# Patient Record
Sex: FEMALE | URBAN - METROPOLITAN AREA
[De-identification: names, ages, dates, MRNs, and addresses within clinical notes are randomized per-mention and may not be internally consistent; named-entity substitution may affect disease eponyms.]

---

## 2023-01-11 ENCOUNTER — HOSPITAL ENCOUNTER (OUTPATIENT)
Dept: GENERAL RADIOLOGY | Age: 48
Discharge: HOME | End: 2023-01-14

## 2023-01-11 DIAGNOSIS — T14.90XA INJURY: ICD-10-CM

## 2023-01-11 PROCEDURE — 73562 X-RAY EXAM OF KNEE 3: CPT

## 2023-01-11 PROCEDURE — 73590 X-RAY EXAM OF LOWER LEG: CPT

## 2023-01-11 PROCEDURE — 73130 X-RAY EXAM OF HAND: CPT

## 2023-01-11 PROCEDURE — 73030 X-RAY EXAM OF SHOULDER: CPT

## 2023-01-11 PROCEDURE — 73610 X-RAY EXAM OF ANKLE: CPT

## 2023-01-11 PROCEDURE — 73110 X-RAY EXAM OF WRIST: CPT

## 2023-01-20 ENCOUNTER — HOSPITAL ENCOUNTER (EMERGENCY)
Age: 48
Discharge: HOME | End: 2023-01-20
Attending: STUDENT IN AN ORGANIZED HEALTH CARE EDUCATION/TRAINING PROGRAM | Admitting: STUDENT IN AN ORGANIZED HEALTH CARE EDUCATION/TRAINING PROGRAM
Payer: COMMERCIAL

## 2023-01-20 ENCOUNTER — APPOINTMENT (OUTPATIENT)
Dept: GENERAL RADIOLOGY | Age: 48
End: 2023-01-20
Payer: COMMERCIAL

## 2023-01-20 ENCOUNTER — APPOINTMENT (OUTPATIENT)
Dept: ULTRASOUND IMAGING | Age: 48
End: 2023-01-20
Payer: COMMERCIAL

## 2023-01-20 VITALS
DIASTOLIC BLOOD PRESSURE: 79 MMHG | SYSTOLIC BLOOD PRESSURE: 144 MMHG | OXYGEN SATURATION: 97 % | BODY MASS INDEX: 39.99 KG/M2 | HEART RATE: 70 BPM | TEMPERATURE: 98.2 F | WEIGHT: 240 LBS | RESPIRATION RATE: 16 BRPM | HEIGHT: 65 IN

## 2023-01-20 DIAGNOSIS — L03.119 CELLULITIS AND ABSCESS OF LEG: ICD-10-CM

## 2023-01-20 DIAGNOSIS — M79.605 LEFT LEG PAIN: Primary | ICD-10-CM

## 2023-01-20 DIAGNOSIS — S80.12XD TRAUMATIC ECCHYMOSIS OF LEFT LOWER LEG, SUBSEQUENT ENCOUNTER: ICD-10-CM

## 2023-01-20 DIAGNOSIS — L02.419 CELLULITIS AND ABSCESS OF LEG: ICD-10-CM

## 2023-01-20 LAB
ALBUMIN SERPL-MCNC: 3.8 G/DL (ref 3.5–5)
ALBUMIN/GLOB SERPL: 1.1 (ref 0.4–1.6)
ALP SERPL-CCNC: 86 U/L (ref 50–130)
ALT SERPL-CCNC: 37 U/L (ref 12–65)
ANION GAP SERPL CALC-SCNC: 8 MMOL/L (ref 2–11)
AST SERPL-CCNC: 16 U/L (ref 15–37)
BILIRUB SERPL-MCNC: 0.5 MG/DL (ref 0.2–1.1)
BUN SERPL-MCNC: 16 MG/DL (ref 6–23)
CALCIUM SERPL-MCNC: 9.4 MG/DL (ref 8.3–10.4)
CHLORIDE SERPL-SCNC: 104 MMOL/L (ref 101–110)
CO2 SERPL-SCNC: 29 MMOL/L (ref 21–32)
CREAT SERPL-MCNC: 0.85 MG/DL (ref 0.6–1)
D DIMER PPP FEU-MCNC: 1.38 UG/ML(FEU)
ERYTHROCYTE [DISTWIDTH] IN BLOOD BY AUTOMATED COUNT: 12.7 % (ref 11.9–14.6)
GLOBULIN SER CALC-MCNC: 3.6 G/DL (ref 2.8–4.5)
GLUCOSE SERPL-MCNC: 115 MG/DL (ref 65–100)
HCT VFR BLD AUTO: 39.4 % (ref 35.8–46.3)
HGB BLD-MCNC: 13.1 G/DL (ref 11.7–15.4)
MCH RBC QN AUTO: 30.2 PG (ref 26.1–32.9)
MCHC RBC AUTO-ENTMCNC: 33.2 G/DL (ref 31.4–35)
MCV RBC AUTO: 90.8 FL (ref 82–102)
NRBC # BLD: 0 K/UL (ref 0–0.2)
PLATELET # BLD AUTO: 280 K/UL (ref 150–450)
PMV BLD AUTO: 9 FL (ref 9.4–12.3)
POTASSIUM SERPL-SCNC: 3.7 MMOL/L (ref 3.5–5.1)
PROT SERPL-MCNC: 7.4 G/DL (ref 6.3–8.2)
RBC # BLD AUTO: 4.34 M/UL (ref 4.05–5.2)
SODIUM SERPL-SCNC: 141 MMOL/L (ref 133–143)
WBC # BLD AUTO: 5.8 K/UL (ref 4.3–11.1)

## 2023-01-20 PROCEDURE — 93971 EXTREMITY STUDY: CPT

## 2023-01-20 PROCEDURE — 96374 THER/PROPH/DIAG INJ IV PUSH: CPT

## 2023-01-20 PROCEDURE — 6360000002 HC RX W HCPCS: Performed by: NURSE PRACTITIONER

## 2023-01-20 PROCEDURE — 73590 X-RAY EXAM OF LOWER LEG: CPT

## 2023-01-20 PROCEDURE — 85027 COMPLETE CBC AUTOMATED: CPT

## 2023-01-20 PROCEDURE — 6370000000 HC RX 637 (ALT 250 FOR IP): Performed by: NURSE PRACTITIONER

## 2023-01-20 PROCEDURE — 99284 EMERGENCY DEPT VISIT MOD MDM: CPT

## 2023-01-20 PROCEDURE — 96375 TX/PRO/DX INJ NEW DRUG ADDON: CPT

## 2023-01-20 PROCEDURE — 80053 COMPREHEN METABOLIC PANEL: CPT

## 2023-01-20 PROCEDURE — 85379 FIBRIN DEGRADATION QUANT: CPT

## 2023-01-20 RX ORDER — CLINDAMYCIN HYDROCHLORIDE 150 MG/1
450 CAPSULE ORAL 3 TIMES DAILY
Qty: 63 CAPSULE | Refills: 0 | Status: SHIPPED | OUTPATIENT
Start: 2023-01-20 | End: 2023-01-27

## 2023-01-20 RX ORDER — EPINEPHRINE 0.3 MG/.3ML
0.3 INJECTION SUBCUTANEOUS
COMMUNITY
Start: 2021-09-29

## 2023-01-20 RX ORDER — FLUTICASONE PROPIONATE 50 MCG
1 SPRAY, SUSPENSION (ML) NASAL
COMMUNITY
Start: 2021-07-14

## 2023-01-20 RX ORDER — CLINDAMYCIN HYDROCHLORIDE 150 MG/1
450 CAPSULE ORAL
Status: COMPLETED | OUTPATIENT
Start: 2023-01-20 | End: 2023-01-20

## 2023-01-20 RX ORDER — IBUPROFEN 800 MG/1
800 TABLET ORAL EVERY 8 HOURS PRN
COMMUNITY
Start: 2021-11-09

## 2023-01-20 RX ORDER — IBUPROFEN 800 MG/1
800 TABLET ORAL EVERY 8 HOURS PRN
Qty: 21 TABLET | Refills: 0 | Status: SHIPPED | OUTPATIENT
Start: 2023-01-20

## 2023-01-20 RX ORDER — CETIRIZINE HYDROCHLORIDE 10 MG/1
TABLET ORAL
COMMUNITY

## 2023-01-20 RX ORDER — MORPHINE SULFATE 10 MG/ML
4 INJECTION INTRAVENOUS ONCE
Status: COMPLETED | OUTPATIENT
Start: 2023-01-20 | End: 2023-01-20

## 2023-01-20 RX ORDER — TRAMADOL HYDROCHLORIDE 50 MG/1
50 TABLET ORAL
Status: COMPLETED | OUTPATIENT
Start: 2023-01-20 | End: 2023-01-20

## 2023-01-20 RX ORDER — AZELASTINE 1 MG/ML
1 SPRAY, METERED NASAL
COMMUNITY
Start: 2021-07-14

## 2023-01-20 RX ORDER — IBUPROFEN 800 MG/1
800 TABLET ORAL
Status: COMPLETED | OUTPATIENT
Start: 2023-01-20 | End: 2023-01-20

## 2023-01-20 RX ORDER — ALBUTEROL SULFATE 90 UG/1
1 AEROSOL, METERED RESPIRATORY (INHALATION) EVERY 6 HOURS PRN
COMMUNITY
Start: 2021-12-22

## 2023-01-20 RX ORDER — ONDANSETRON 2 MG/ML
4 INJECTION INTRAMUSCULAR; INTRAVENOUS
Status: COMPLETED | OUTPATIENT
Start: 2023-01-20 | End: 2023-01-20

## 2023-01-20 RX ADMIN — TRAMADOL HYDROCHLORIDE 50 MG: 50 TABLET ORAL at 17:12

## 2023-01-20 RX ADMIN — CLINDAMYCIN HYDROCHLORIDE 450 MG: 150 CAPSULE ORAL at 20:14

## 2023-01-20 RX ADMIN — MORPHINE SULFATE 4 MG: 10 INJECTION INTRAVENOUS at 18:19

## 2023-01-20 RX ADMIN — ONDANSETRON 4 MG: 2 INJECTION INTRAMUSCULAR; INTRAVENOUS at 18:19

## 2023-01-20 RX ADMIN — IBUPROFEN 800 MG: 800 TABLET, FILM COATED ORAL at 17:12

## 2023-01-20 ASSESSMENT — PAIN SCALES - GENERAL
PAINLEVEL_OUTOF10: 7
PAINLEVEL_OUTOF10: 6
PAINLEVEL_OUTOF10: 4

## 2023-01-20 ASSESSMENT — ENCOUNTER SYMPTOMS
NAUSEA: 0
SHORTNESS OF BREATH: 0
BACK PAIN: 0
ABDOMINAL PAIN: 0
COLOR CHANGE: 1
EYES NEGATIVE: 1
DIARRHEA: 0

## 2023-01-20 ASSESSMENT — PAIN - FUNCTIONAL ASSESSMENT: PAIN_FUNCTIONAL_ASSESSMENT: 0-10

## 2023-01-20 NOTE — ED NOTES
Medicated as ordered with health teaching before pt went to xray per MD; IV site intact. Pt alert, resp easy, NAD.       Leonardo Jerry RN  01/20/23 8599

## 2023-01-20 NOTE — ED NOTES
Pt states fell down the stairs approximately 10 days ago, injuring left shin. C/o pain to touch, denies numbness, states able to walk on. Bruising, swelling and dark raised lump to left shin with some warmth. Pt states painful to touch all the way into thigh. Pedal pulse strong.       Tay Luz RN  01/20/23 Bon Torres RN  01/20/23 5502

## 2023-01-20 NOTE — ED NOTES
Pt returned from ultrasound, states \"residual pain\" after exams. Resting in bed alert, resp easy, NAD.       Sandhya Brower RN  01/20/23 2060

## 2023-01-20 NOTE — ED TRIAGE NOTES
Pt w/c to triage with c/o left lower leg pain/injury. Pt states she fell down a flight of stairs/raquel 6 steps on 1/11/23 at 0925. Pt reports she was seen that same morning by Josiah, had an XR, and was diagnosed with a bone bruise. Pt reports she woke today with a red spot to the back of her calf that she reports has resolved, but c/o continuous bruising and pain. Pt was told to come be seen for possible blood clot. Pt denies history of blood clots.

## 2023-01-20 NOTE — ED NOTES
Pt in [address] for DVT study. US tech called provider to inform that Pt refused the US because of pain and unable to tolerate.  - Jaylan Reyes 14:35

## 2023-01-20 NOTE — ED PROVIDER NOTES
Emergency Department Provider Note                   PCP:                Tracey Jay MD               Age: 49 y.o. Sex: female       ICD-10-CM    1. Left leg pain  M79.605       2. Traumatic ecchymosis of left lower leg, subsequent encounter  S80.12XD       3. Cellulitis and abscess of leg  L03.119     L02.419           DISPOSITION      Luke Mayer is a 49 y.o. female who presents to the Emergency Department with chief complaint of left leg pain following fall. Patient attempted to go to vascular studies but was unable to tolerate the procedure due to pain. We will get basic blood work including a D-dimer to further evaluate for clotting. Patient's legs are significantly bruised to the left lower leg. She has not used any heat, ice or compression. No evidence of cellulitis or subq gas. She has normal vitals today. Ibuprofen and tramadol given for pain. 6:31 PM  Dimer is elevated at 1.34. Discussed with patient the need to rule out a DVT to her leg given this elevated blood value. Dose of morphine and Zofran given IV prior to her procedure. She additionally wanted her left lower leg reimaged so x-rays are ordered. The remainder of her blood work is unremarkable. No evidence of elevated white count, stable H&H. Repeat x-ray is negative for acute fracture. Ultrasound is negative for DVT. Upon further review of patient's left lower extremity, I have a concern for an onset of cellulitis. Her leg is red and warm around the 5pm to 12pm location. See attached photo. We will start patient on clindamycin, first dose here in the ED. Instructed her to return to the ED after 36 hours if any worsening redness, warmth or swelling. Patient is agreeable to this plan. PCP follow-up in 1 week for wound reassessment. We will place an Ace bandage to help with compression. Ibuprofen for pain control. Strict return precautions given. Safe for discharge at this time.     Telly Everett FNP-C, ENP-C  12:18 AM      Medical Decision Making  Amount and/or Complexity of Data Reviewed  Labs: ordered. Radiology: ordered. ECG/medicine tests: ordered. Risk  Prescription drug management. Complexity of Problem: 1 stable, acute illness. (3)    I have conducted an independent ordering and review of Labs. I have conducted an independent ordering and review of X-rays. I have conducted an independent ordering and review of Ultrasound. Considerations: Shared decision making was utilized in the care of this patient. Considerations: The following treatments were considered but not given: Rx such as antibiotics. I did end up giving antibiotics upon d/c. Considered CT scan of leg                 Orders Placed This Encounter   Procedures    CBC    Comprehensive Metabolic Panel    Insert peripheral IV    Vascular duplex lower extremity venous left        Medications - No data to display    New Prescriptions    No medications on file        Sindy Pérez is a 49 y.o. female who presents to the Emergency Department with chief complaint of left leg pain following fall. Chief Complaint   Patient presents with    Leg Pain      HPI  Aiden Tucker is a 43-year-old female with history of chronic knee pain, presenting to the ED for evaluation of left leg pain following fall. On January 11, patient reportedly fell down 6 stairs at her workplace. She was imaged and had no broken bones at that time. She has been doing symptomatic management since that time but continues to have worsening bruising and swelling and continued pain. She saw her primary care today and given her continued pain and a small red spot she salt to her posterior calf earlier this morning, they sent her here for rule out DVT. She has no prior history of DVT. She denies chest pain, shortness of breath, recent car travel or surgeries. Review of Systems   Constitutional:  Negative for fever. HENT: Negative. Eyes: Negative. Respiratory:  Negative for shortness of breath. Cardiovascular:  Positive for leg swelling (bilat, left worse). Negative for chest pain. Gastrointestinal:  Negative for abdominal pain, diarrhea and nausea. Musculoskeletal:  Negative for arthralgias and back pain. Skin:  Positive for color change (significant brusing to left leg). Neurological:  Negative for headaches. All other systems reviewed and are negative. History reviewed. No pertinent past medical history. History reviewed. No pertinent surgical history. History reviewed. No pertinent family history. Social History     Socioeconomic History    Marital status: Unknown     Spouse name: None    Number of children: None    Years of education: None    Highest education level: None         Codeine, Dexamethasone, Loracarbef, Nitrofurantoin, Loratadine-pseudoephedrine er, Sulfa antibiotics, and Loratadine     Previous Medications    ALBUTEROL SULFATE HFA (PROVENTIL;VENTOLIN;PROAIR) 108 (90 BASE) MCG/ACT INHALER    Inhale 1 puff into the lungs every 6 hours as needed    ASPIRIN-ACETAMINOPHEN-CAFFEINE (EXCEDRIN PO)    Take by mouth as needed    AZELASTINE (ASTELIN) 0.1 % NASAL SPRAY    1 spray by Nasal route    CETIRIZINE (ZYRTEC) 10 MG TABLET    Take by mouth    EPINEPHRINE (EPIPEN) 0.3 MG/0.3ML SOAJ INJECTION    Inject 0.3 mg into the muscle once as needed    FLUTICASONE (FLONASE) 50 MCG/ACT NASAL SPRAY    1 spray by Nasal route    IBUPROFEN (ADVIL;MOTRIN) 800 MG TABLET    Take 800 mg by mouth every 8 hours as needed        Vitals signs and nursing note reviewed. Patient Vitals for the past 4 hrs:   Temp Pulse Resp BP SpO2   01/20/23 1531 98.2 °F (36.8 °C) 80 17 (!) 136/96 100 %          Physical Exam  Vitals and nursing note reviewed. Constitutional:       Appearance: She is not ill-appearing. HENT:      Mouth/Throat:      Mouth: Mucous membranes are moist.      Pharynx: Oropharynx is clear.    Cardiovascular:      Rate and Rhythm: Normal rate. Pulmonary:      Effort: Pulmonary effort is normal.      Breath sounds: Normal breath sounds. Comments: Respirations even and unlabored  Abdominal:      General: Abdomen is flat. Palpations: Abdomen is soft. Musculoskeletal:      Comments: Mild bruising noted to the right lower leg. Significant bruising and swelling to the left leg and ankle, below the knee. Plantar flexion and extension intact. Pedal pulses are present on both legs. Blister noted over the middle of the tibia around 1 cm x 1 cm in diameter. Skin integrity is intact. Skin:     General: Skin is warm and dry. Psychiatric:         Mood and Affect: Mood normal.          Procedures    No results found for any visits on 01/20/23. Vascular duplex lower extremity venous left   Final Result   1. No evidence of deep venous thrombosis in the left lower extremity. XR TIBIA FIBULA LEFT (2 VIEWS)   Final Result   No displaced fracture. Voice dictation software was used during the making of this note. This software is not perfect and grammatical and other typographical errors may be present. This note has not been completely proofread for errors.         ADAM Zavaleta - MARYLOU  01/21/23 0020

## 2023-01-21 NOTE — DISCHARGE INSTRUCTIONS
As we discussed, I do think you are developing a cellulitis to that left lower leg. Take doses as directed tomorrow. On Sunday morning, you should have no worsening redness or warmth. Use the Ace bandage to help with compression. Use heat to help with blood absorption. Return to the ED in 36 hours if your symptoms worsen in terms of redness and warmth. Take your antibiotic as directed. This will take a long time for the blood to fully reabsorb. Follow-up with your primary care in 1 week return to the ED for new or worsening symptoms. No DVT today.

## 2023-01-21 NOTE — ED NOTES
Pt states awaiting  to pick her up. Crackers given at request. Ace wrap applied with health teaching, pt tolerated well. Ambulated out, NAD.       Joseph Tao RN  01/20/23 2022

## 2023-05-07 ENCOUNTER — HOSPITAL ENCOUNTER (EMERGENCY)
Age: 48
Discharge: HOME | End: 2023-05-07
Attending: EMERGENCY MEDICINE
Payer: COMMERCIAL

## 2023-05-07 ENCOUNTER — APPOINTMENT (OUTPATIENT)
Dept: CT IMAGING | Age: 48
End: 2023-05-07
Payer: COMMERCIAL

## 2023-05-07 VITALS
BODY MASS INDEX: 38.82 KG/M2 | HEIGHT: 65 IN | RESPIRATION RATE: 13 BRPM | OXYGEN SATURATION: 100 % | SYSTOLIC BLOOD PRESSURE: 133 MMHG | HEART RATE: 56 BPM | TEMPERATURE: 98.8 F | DIASTOLIC BLOOD PRESSURE: 94 MMHG | WEIGHT: 233 LBS

## 2023-05-07 DIAGNOSIS — R07.9 CHEST PAIN, UNSPECIFIED TYPE: Primary | ICD-10-CM

## 2023-05-07 LAB
ALBUMIN SERPL-MCNC: 3.4 G/DL (ref 3.5–5)
ALBUMIN/GLOB SERPL: 1.1 (ref 0.4–1.6)
ALP SERPL-CCNC: 68 U/L (ref 50–136)
ALT SERPL-CCNC: 22 U/L (ref 12–65)
ANION GAP SERPL CALC-SCNC: 2 MMOL/L (ref 2–11)
AST SERPL-CCNC: 14 U/L (ref 15–37)
BILIRUB SERPL-MCNC: 0.3 MG/DL (ref 0.2–1.1)
BUN SERPL-MCNC: 18 MG/DL (ref 6–23)
CALCIUM SERPL-MCNC: 8.6 MG/DL (ref 8.3–10.4)
CHLORIDE SERPL-SCNC: 109 MMOL/L (ref 101–110)
CO2 SERPL-SCNC: 28 MMOL/L (ref 21–32)
CREAT SERPL-MCNC: 0.9 MG/DL (ref 0.6–1)
EKG ATRIAL RATE: 55 BPM
EKG DIAGNOSIS: NORMAL
EKG P AXIS: 10 DEGREES
EKG P-R INTERVAL: 123 MS
EKG Q-T INTERVAL: 410 MS
EKG QRS DURATION: 80 MS
EKG QTC CALCULATION (BAZETT): 393 MS
EKG R AXIS: 35 DEGREES
EKG T AXIS: 16 DEGREES
EKG VENTRICULAR RATE: 55 BPM
ERYTHROCYTE [DISTWIDTH] IN BLOOD BY AUTOMATED COUNT: 13.7 % (ref 11.9–14.6)
GLOBULIN SER CALC-MCNC: 3.1 G/DL (ref 2.8–4.5)
GLUCOSE SERPL-MCNC: 113 MG/DL (ref 65–100)
HCT VFR BLD AUTO: 37.8 % (ref 35.8–46.3)
HGB BLD-MCNC: 12.9 G/DL (ref 11.7–15.4)
MCH RBC QN AUTO: 30.8 PG (ref 26.1–32.9)
MCHC RBC AUTO-ENTMCNC: 34.1 G/DL (ref 31.4–35)
MCV RBC AUTO: 90.2 FL (ref 82–102)
NRBC # BLD: 0 K/UL (ref 0–0.2)
PLATELET # BLD AUTO: 199 K/UL (ref 150–450)
PMV BLD AUTO: 9.1 FL (ref 9.4–12.3)
POTASSIUM SERPL-SCNC: 3.6 MMOL/L (ref 3.5–5.1)
PROT SERPL-MCNC: 6.5 G/DL (ref 6.3–8.2)
RBC # BLD AUTO: 4.19 M/UL (ref 4.05–5.2)
SODIUM SERPL-SCNC: 139 MMOL/L (ref 133–143)
TROPONIN I SERPL HS-MCNC: 3.6 PG/ML (ref 0–37)
WBC # BLD AUTO: 5.2 K/UL (ref 4.3–11.1)

## 2023-05-07 PROCEDURE — 80053 COMPREHEN METABOLIC PANEL: CPT

## 2023-05-07 PROCEDURE — 6360000004 HC RX CONTRAST MEDICATION: Performed by: EMERGENCY MEDICINE

## 2023-05-07 PROCEDURE — 71260 CT THORAX DX C+: CPT

## 2023-05-07 PROCEDURE — 85027 COMPLETE CBC AUTOMATED: CPT

## 2023-05-07 PROCEDURE — 94761 N-INVAS EAR/PLS OXIMETRY MLT: CPT

## 2023-05-07 PROCEDURE — 93005 ELECTROCARDIOGRAM TRACING: CPT | Performed by: EMERGENCY MEDICINE

## 2023-05-07 PROCEDURE — 84484 ASSAY OF TROPONIN QUANT: CPT

## 2023-05-07 PROCEDURE — 99285 EMERGENCY DEPT VISIT HI MDM: CPT

## 2023-05-07 RX ADMIN — IOPAMIDOL 100 ML: 755 INJECTION, SOLUTION INTRAVENOUS at 14:18

## 2023-05-07 ASSESSMENT — PAIN - FUNCTIONAL ASSESSMENT: PAIN_FUNCTIONAL_ASSESSMENT: 0-10

## 2023-05-07 ASSESSMENT — ENCOUNTER SYMPTOMS
SHORTNESS OF BREATH: 1
COUGH: 0
DIARRHEA: 0
ABDOMINAL PAIN: 0
FACIAL SWELLING: 0
VOMITING: 0

## 2023-05-07 ASSESSMENT — PAIN SCALES - GENERAL: PAINLEVEL_OUTOF10: 0

## 2023-06-20 ENCOUNTER — INITIAL CONSULT (OUTPATIENT)
Age: 48
End: 2023-06-20
Payer: COMMERCIAL

## 2023-06-20 VITALS
DIASTOLIC BLOOD PRESSURE: 88 MMHG | HEIGHT: 65 IN | BODY MASS INDEX: 39.29 KG/M2 | HEART RATE: 72 BPM | SYSTOLIC BLOOD PRESSURE: 138 MMHG | WEIGHT: 235.8 LBS

## 2023-06-20 DIAGNOSIS — R07.89 CHEST DISCOMFORT: Primary | ICD-10-CM

## 2023-06-20 PROCEDURE — 93000 ELECTROCARDIOGRAM COMPLETE: CPT | Performed by: INTERNAL MEDICINE

## 2023-06-20 PROCEDURE — 99204 OFFICE O/P NEW MOD 45 MIN: CPT | Performed by: INTERNAL MEDICINE

## 2023-06-20 NOTE — PROGRESS NOTES
[facility] CARDIOLOGY  [address] [facility], [address]  PHONE: [phone]      23    NAME:  Nora Burris  : 1975  MRN: [id number]         SUBJECTIVE:   Nora Burris is a 51 y.o. female seen for a consultation visit regarding the following:     Chief Complaint   Patient presents with    Consultation     Chest pain             HPI:  Consultation is requested by ADAM Zarate NP for evaluation of Consultation (Chest pain )   . Ms. Rosanne Durbin presents today for follow-up. Patient was seen in the ER recently with chest pain. Patient stated that chest pain been going on for 3 straight days, said it felt like a panic attack she has had previously however her panic attacks typically not lasted this long. She notes multiple life stressors including the recent addition of her in-laws to her home, both of which suffer from dementia in various stages. She has no prior history of heart disease. She has several family members with what sounds like atrial fibrillation. There is no first-degree relatives with early onset CAD or CHF. She is a non-smoker. She denies orthopnea, PND, palpitations, syncope or lower extremity swelling. Work-up in the ER included a normal EKG, normal troponin and a CT a of the chest which showed no PE. Key CAD CHF Meds       Patient is on no cardiovascular meds. Key Antihyperglycemic Medications            metFORMIN (GLUCOPHAGE) 500 MG tablet (Taking)    Class: Historical Med              Past Medical History, Past Surgical History, Family history, Social History, and Medications were all reviewed with the patient today and updated as necessary. Prior to Admission medications    Medication Sig Start Date End Date Taking?  Authorizing Provider   Desvenlafaxine Succinate (PRISTIQ PO) Take by mouth   Yes Historical Provider, MD   VITAMIN D PO Take by mouth   Yes Historical Provider, MD   Fexofenadine HCl (ALLEGRA PO) Take by

## 2023-08-03 ENCOUNTER — APPOINTMENT (OUTPATIENT)
Dept: GENERAL RADIOLOGY | Age: 48
End: 2023-08-03
Payer: COMMERCIAL

## 2023-08-03 ENCOUNTER — HOSPITAL ENCOUNTER (EMERGENCY)
Age: 48
Discharge: HOME | End: 2023-08-03
Attending: EMERGENCY MEDICINE
Payer: COMMERCIAL

## 2023-08-03 VITALS
RESPIRATION RATE: 14 BRPM | OXYGEN SATURATION: 97 % | WEIGHT: 235 LBS | TEMPERATURE: 98.5 F | SYSTOLIC BLOOD PRESSURE: 133 MMHG | HEART RATE: 90 BPM | BODY MASS INDEX: 39.15 KG/M2 | DIASTOLIC BLOOD PRESSURE: 72 MMHG | HEIGHT: 65 IN

## 2023-08-03 DIAGNOSIS — T78.40XA ALLERGIC REACTION, INITIAL ENCOUNTER: Primary | ICD-10-CM

## 2023-08-03 PROCEDURE — 70360 X-RAY EXAM OF NECK: CPT

## 2023-08-03 PROCEDURE — 99283 EMERGENCY DEPT VISIT LOW MDM: CPT

## 2023-08-03 RX ORDER — EPINEPHRINE 0.3 MG/.3ML
0.3 INJECTION SUBCUTANEOUS ONCE
Qty: 2 EACH | Refills: 0 | Status: SHIPPED | OUTPATIENT
Start: 2023-08-03 | End: 2023-08-03

## 2023-08-03 RX ORDER — PREDNISONE 50 MG/1
50 TABLET ORAL DAILY
Qty: 5 TABLET | Refills: 0 | Status: SHIPPED | OUTPATIENT
Start: 2023-08-03 | End: 2023-08-08

## 2023-08-03 ASSESSMENT — ENCOUNTER SYMPTOMS
SHORTNESS OF BREATH: 0
TROUBLE SWALLOWING: 0
FACIAL SWELLING: 1
COUGH: 0

## 2023-08-03 ASSESSMENT — LIFESTYLE VARIABLES
HOW MANY STANDARD DRINKS CONTAINING ALCOHOL DO YOU HAVE ON A TYPICAL DAY: 1 OR 2
HOW OFTEN DO YOU HAVE A DRINK CONTAINING ALCOHOL: 2-3 TIMES A WEEK

## 2023-08-03 NOTE — ED PROVIDER NOTES
Emergency Department Provider Note       PCP: ADAM Weeks NP   Age: 51 y.o. Sex: female     DISPOSITION Decision To Discharge 08/03/2023 04:50:11 PM       ICD-10-CM    1. Allergic reaction, initial encounter  T78.40XA           Medical Decision Making     Complexity of Problems Addressed:  1 or more acute illnesses that pose a threat to life or bodily function. Data Reviewed and Analyzed:  Category 1:   I independently ordered and reviewed each unique test.         Category 2:       Category 3: Discussion of management or test interpretation. .  About 2 hours of observation no worsening of symptoms are noted. Risk of Complications and/or Morbidity of Patient Management:  Prescription drug management performed. Shared medical decision making was utilized in creating the patients health plan today. History      Rachel Gutierrez is a 51 y.o. female who presents to the Emergency Department with chief complaint of    Chief Complaint   Patient presents with    Allergic Reaction    Abdominal Cramping    Facial Swelling      Patient presents with a possible allergic reaction from home by EMS. Patient has several food allergies and medication allergies however she is unaware of what she may have ingested causing allergic reaction. She did eat some yogurt but had strawberries in it. She had developed swelling of her eyes and urticaria. EMS was summoned and reported noting stridor. Patient received IV Solu-Medrol as well as Benadryl and 2 doses of epi. Patient states she had an EpiPen at 1 time however was 3years old and she stopped carrying it. She denies any nausea, vomiting or diarrhea. She is currently taking amoxicillin and a Medrol Dosepak for an episode of vertigo, this was prescribed by her primary care provider. The history is provided by the patient and medical records. Review of Systems   HENT:  Positive for facial swelling. Negative for trouble swallowing.

## 2023-08-03 NOTE — ED TRIAGE NOTES
Pt arrives from work w/ cc of allergic reaction that started following consumption of strawberry yogurt (pt allergic to pitted fruits). Reaction began in R eye then moved to her left and hives on neck and chest. Pt complaining of abd cramping. Stridor noted upon arrival w/ ems and improved en route.  2 doses 0.5 mg epi and 125 solumedrol and pt took 50mg bendryl po prior to ems arrival 1L NS. VSS Pt A&O x 4